# Patient Record
Sex: MALE | Race: WHITE | NOT HISPANIC OR LATINO | Employment: FULL TIME | URBAN - METROPOLITAN AREA
[De-identification: names, ages, dates, MRNs, and addresses within clinical notes are randomized per-mention and may not be internally consistent; named-entity substitution may affect disease eponyms.]

---

## 2018-08-14 ENCOUNTER — OFFICE VISIT (OUTPATIENT)
Dept: RETAIL CLINIC | Facility: CLINIC | Age: 38
End: 2018-08-14

## 2018-08-14 VITALS
SYSTOLIC BLOOD PRESSURE: 118 MMHG | HEART RATE: 78 BPM | DIASTOLIC BLOOD PRESSURE: 74 MMHG | TEMPERATURE: 97.4 F | RESPIRATION RATE: 18 BRPM | OXYGEN SATURATION: 98 %

## 2018-08-14 DIAGNOSIS — T30.0 BURN: Primary | ICD-10-CM

## 2018-08-14 PROCEDURE — 99213 OFFICE O/P EST LOW 20 MIN: CPT | Performed by: NURSE PRACTITIONER

## 2018-08-14 NOTE — PATIENT INSTRUCTIONS
Burn Care, Adult  A burn is an injury to the skin or the tissues under the skin. There are three types of burns:  · First degree. These burns may cause the skin to be red and slightly swollen.  · Second degree. These burns are very painful and cause the skin to be very red. The skin may also leak fluid, look shiny, and develop blisters.  · Third degree. These burns cause permanent damage. They either turn the skin white or black and make it look charred, dry, and leathery.    Taking care of your burn properly can help to prevent pain and infection. It can also help the burn to heal more quickly.  What are the risks?  Complications from burns include:  · Damage to the skin.  · Reduced blood flow near the injury.  · Dead tissue.  · Scarring.  · Problems with movement, if the burn happened near a joint or on the hands or feet.    Severe burns can lead to problems that affect the whole body, such as:  · Fluid loss.  · Less blood circulating in the body.  · Inability to maintain a normal core body temperature (thermoregulation).  · Infection.  · Shock.  · Problems breathing.    How to care for a first-degree burn  Right after a burn:  · Rinse or soak the burn under cool water until the pain stops. Do not put ice on your burn. This can cause more damage.  · Lightly cover the burn with a sterile cloth (dressing).  Burn care  · Follow instructions from your health care provider about:  ? How to clean and take care of the burn.  ? When to change and remove the dressing.  · Check your burn every day for signs of infection. Check for:  ? More redness, swelling, or pain.  ? Warmth.  ? Pus or a bad smell.  Medicine  · Take over-the-counter and prescription medicines only as told by your health care provider.  · If you were prescribed antibiotic medicine, take or apply it as told by your health care provider. Do not stop using the antibiotic even if your condition improves.  General instructions  · To prevent infection, do not  put butter, oil, or other home remedies on your burn.  · Do not rub your burn, even when you are cleaning it.  · Protect your burn from the sun.  How to care for a second-degree burn  Right after a burn:  · Rinse or soak the burn under cool water. Do this for several minutes. Do not put ice on your burn. This can cause more damage.  · Lightly cover the burn with a sterile cloth (dressing).  Burn care  · Raise (elevate) the injured area above the level of your heart while sitting or lying down.  · Follow instructions from your health care provider about:  ? How to clean and take care of the burn.  ? When to change and remove the dressing.  · Check your burn every day for signs of infection. Check for:  ? More redness, swelling, or pain.  ? Warmth.  ? Pus or a bad smell.  Medicine    · Take over-the-counter and prescription medicines only as told by your health care provider.  · If you were prescribed antibiotic medicine, take or apply it as told by your health care provider. Do not stop using the antibiotic even if your condition improves.  General instructions  · To prevent infection:  ? Do not put butter, oil, or other home remedies on the burn.  ? Do not scratch or pick at the burn.  ? Do not break any blisters.  ? Do not peel skin.  · Do not rub your burn, even when you are cleaning it.  · Protect your burn from the sun.  How to care for a third-degree burn  Right after a burn:  · Lightly cover the burn with gauze.  · Seek immediate medical attention.  Burn care  · Raise (elevate) the injured area above the level of your heart while sitting or lying down.  · Drink enough fluid to keep your urine clear or pale yellow.  · Rest as told by your health care provider. Do not participate in sports or other physical activities until your health care provider approves.  · Follow instructions from your health care provider about:  ? How to clean and take care of the burn.  ? When to change and remove the dressing.  · Check  your burn every day for signs of infection. Check for:  ? More redness, swelling, or pain.  ? Warmth.  ? Pus or a bad smell.  Medicine  · Take over-the-counter and prescription medicines only as told by your health care provider.  · If you were prescribed antibiotic medicine, take or apply it as told by your health care provider. Do not stop using the antibiotic even if your condition improves.  General instructions  · To prevent infection:  ? Do not put butter, oil, or other home remedies on the burn.  ? Do not scratch or pick at the burn.  ? Do not break any blisters.  ? Do not peel skin.  ? Do not rub your burn, even when you are cleaning it.  · Protect your burn from the sun.  · Keep all follow-up visits as told by your health care provider. This is important.  Contact a health care provider if:  · Your condition does not improve.  · Your condition gets worse.  · You have a fever.  · Your burn changes in appearance or develops black or red spots.  · Your burn feels warm to the touch.  · Your pain is not controlled with medicine.  Get help right away if:  · You have redness, swelling, or pain at the site of the burn.  · You have fluid, blood, or pus coming from your burn.  · You have red streaks near the burn.  · You have severe pain.  This information is not intended to replace advice given to you by your health care provider. Make sure you discuss any questions you have with your health care provider.  Document Released: 12/18/2006 Document Revised: 07/09/2017 Document Reviewed: 06/06/2017  [employer] Interactive Patient Education © 2018 [employer]
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
## 2019-03-25 ENCOUNTER — OFFICE VISIT (OUTPATIENT)
Dept: RETAIL CLINIC | Facility: CLINIC | Age: 39
End: 2019-03-25

## 2019-03-25 VITALS
DIASTOLIC BLOOD PRESSURE: 90 MMHG | RESPIRATION RATE: 18 BRPM | TEMPERATURE: 98.1 F | HEART RATE: 94 BPM | SYSTOLIC BLOOD PRESSURE: 134 MMHG | OXYGEN SATURATION: 98 %

## 2019-03-25 DIAGNOSIS — R30.9 PAIN WITH URINATION: Primary | ICD-10-CM

## 2019-03-25 DIAGNOSIS — R35.0 URINE FREQUENCY: ICD-10-CM

## 2019-03-25 LAB
BILIRUB BLD-MCNC: NEGATIVE MG/DL
CLARITY, POC: ABNORMAL
COLOR UR: YELLOW
GLUCOSE UR STRIP-MCNC: NEGATIVE MG/DL
KETONES UR QL: NEGATIVE
LEUKOCYTE EST, POC: NEGATIVE
NITRITE UR-MCNC: NEGATIVE MG/ML
PH UR: 6 [PH] (ref 5–8)
PROT UR STRIP-MCNC: NEGATIVE MG/DL
RBC # UR STRIP: ABNORMAL /UL
SP GR UR: 1.02 (ref 1–1.03)
UROBILINOGEN UR QL: NORMAL

## 2019-03-25 PROCEDURE — 99213 OFFICE O/P EST LOW 20 MIN: CPT | Performed by: NURSE PRACTITIONER

## 2019-03-25 PROCEDURE — 81003 URINALYSIS AUTO W/O SCOPE: CPT | Performed by: NURSE PRACTITIONER

## 2019-03-25 RX ORDER — CIPROFLOXACIN 500 MG/1
500 TABLET, FILM COATED ORAL 2 TIMES DAILY
Qty: 28 TABLET | Refills: 0 | Status: SHIPPED | OUTPATIENT
Start: 2019-03-25 | End: 2019-04-08

## 2019-03-25 NOTE — PATIENT INSTRUCTIONS
Urinary Frequency, Adult  Urinary frequency means urinating more often than usual. People with urinary frequency urinate at least 8 times in 24 hours, even if they drink a normal amount of fluid. Although they urinate more often than normal, the total amount of urine produced in a day may be normal. Urinary frequency is also called pollakiuria.  What are the causes?  This condition may be caused by:  · A urinary tract infection.  · Obesity.  · Bladder problems, such as bladder stones.  · Caffeine or alcohol.  · Eating food or drinking fluids that irritate the bladder. These include coffee, tea, soda, artificial sweeteners, citrus, tomato-based foods, and chocolate.  · Certain medicines, such as medicines that help the body get rid of extra fluid (diuretics).  · Muscle or nerve weakness.  · Overactive bladder.  · Chronic diabetes.  · Interstitial cystitis.  · In men, problems with the prostate, such as an enlarged prostate.  · In women, pregnancy.    In some cases, the cause may not be known.  What increases the risk?  This condition is more likely to develop in:  · Women who have gone through menopause.  · Men with prostate problems.  · People with a disease or injury that affects the nerves or spinal cord.  · People who have or have had a condition that affects the brain, such as a stroke.    What are the signs or symptoms?  Symptoms of this condition include:  · Feeling an urgent need to urinate often. The stress and anxiety of needing to find a bathroom quickly can make this urge worse.  · Urinating 8 or more times in 24 hours.  · Urinating as often as every 1 to 2 hours.    How is this diagnosed?  This condition is diagnosed based on your symptoms, your medical history, and a physical exam. You may have tests, such as:  · Blood tests.  · Urine tests.  · Imaging tests, such as X-rays or ultrasounds.  · A bladder test.  · A test of your neurological system. This is the body system that senses the need to  urinate.  · A test to check for problems in the urethra and bladder called cystoscopy.    You may also be asked to keep a bladder diary. A bladder diary is a record of what you eat and drink, how often you urinate, and how much you urinate. You may need to see a health care provider who specializes in conditions of the urinary tract (urologist) or kidneys (nephrologist).  How is this treated?  Treatment for this condition depends on the cause. Sometimes the condition goes away on its own and treatment is not necessary. If treatment is needed, it may include:  · Taking medicine.  · Learning exercises that strengthen the muscles that help control urination.  · Following a bladder training program. This may include:  ? Learning to delay going to the bathroom.  ? Double urinating (voiding). This helps if you are not completely emptying your bladder.  ? Scheduled voiding.  · Making diet changes, such as:  ? Avoiding caffeine.  ? Drinking fewer fluids, especially alcohol.  ? Not drinking in the evening.  ? Not having foods or drinks that may irritate the bladder.  ? Eating foods that help prevent or ease constipation. Constipation can make this condition worse.  · Having the nerves in your bladder stimulated. There are two options for stimulating the nerves to your bladder:  ? Outpatient electrical nerve stimulation. This is done by your health care provider.  ? Surgery to implant a bladder pacemaker. The pacemaker helps to control the urge to urinate.    Follow these instructions at home:  · Keep a bladder diary if told to by your health care provider.  · Take over-the-counter and prescription medicines only as told by your health care provider.  · Do any exercises as told by your health care provider.  · Follow a bladder training program as told by your health care provider.  · Make any recommended diet changes.  · Keep all follow-up visits as told by your health care provider. This is important.  Contact a health care  provider if:  · You start urinating more often.  · You feel pain or irritation when you urinate.  · You notice blood in your urine.  · Your urine looks cloudy.  · You develop a fever.  · You begin vomiting.  Get help right away if:  · You are unable to urinate.  This information is not intended to replace advice given to you by your health care provider. Make sure you discuss any questions you have with your health care provider.  Document Released: 10/14/2010 Document Revised: 01/18/2017 Document Reviewed: 07/13/2016  [employer] Interactive Patient Education © 2019 [employer]    Prostatitis  Prostatitis is swelling or inflammation of the prostate gland. The prostate is a walnut-sized gland that is involved in the production of semen. It is located below a man's bladder, in front of the rectum.  There are four types of prostatitis:  · Chronic nonbacterial prostatitis. This is the most common type of prostatitis. It may be associated with a viral infection or autoimmune disorder.  · Acute bacterial prostatitis. This is the least common type of prostatitis. It starts quickly and is usually associated with a bladder infection, high fever, and shaking chills. It can occur at any age.  · Chronic bacterial prostatitis. This type usually results from acute bacterial prostatitis that happens repeatedly (is recurrent) or has not been treated properly. It can occur in men of any age but is most common among middle-aged men whose prostate has begun to get larger. The symptoms are not as severe as symptoms caused by acute bacterial prostatitis.  · Prostatodynia or chronic pelvic pain syndrome (CPPS). This type is also called pelvic floor disorder. It is associated with increased muscular tone in the pelvis surrounding the prostate.    What are the causes?  Bacterial prostatitis is caused by infection from bacteria. Chronic nonbacterial prostatitis may be caused by:  · Urinary tract infections (UTIs).  · Nerve damage.  · A  response by the body’s disease-fighting system (autoimmune response).  · Chemicals in the urine.    The causes of the other types of prostatitis are usually not known.  What are the signs or symptoms?  Symptoms of this condition vary depending upon the type of prostatitis. If you have acute bacterial prostatitis, you may experience:  · Urinary symptoms, such as:  ? Painful urination.  ? Burning during urination.  ? Frequent and sudden urges to urinate.  ? Inability to start urinating.  ? A weak or interrupted stream of urine.  · Vomiting.  · Nausea.  · Fever.  · Chills.  · Inability to empty the bladder completely.  · Pain in the:  ? Muscles or joints.  ? Lower back.  ? Lower abdomen.    If you have any of the other types of prostatitis, you may experience:  · Urinary symptoms, such as:  ? Sudden urges to urinate.  ? Frequent urination.  ? Difficulty starting urination.  ? Weak urine stream.  ? Dribbling after urination.  · Discharge from the urethra. The urethra is a tube that opens at the end of the penis.  · Pain in the:  ? Testicles.  ? Penis or tip of the penis.  ? Rectum.  ? Area in front of the rectum and below the scrotum (perineum).  · Problems with sexual function.  · Painful ejaculation.  · Bloody semen.    How is this diagnosed?  This condition may be diagnosed based on:  · A physical and medical exam.  · Your symptoms.  · A urine test to check for bacteria.  · An exam in which a health care provider uses a finger to feel the prostate (digital rectal exam).  · A test of a sample of semen.  · Blood tests.  · Ultrasound.  · Removal of prostate tissue to be examined under a microscope (biopsy).  · Tests to check how your body handles urine (urodynamic tests).  · A test to look inside your bladder or urethra (cystoscopy).    How is this treated?  Treatment for this condition depends on the type of prostatitis. Treatment may involve:  · Medicines to relieve pain or inflammation.  · Medicines to help relax  your muscles.  · Physical therapy.  · Heat therapy.  · Techniques to help you control certain body functions (biofeedback).  · Relaxation exercises.  · Antibiotic medicine, if your condition is caused by bacteria.  · Warm water baths (sitz baths). Sitz baths help with relaxing your pelvic floor muscles, which helps to relieve pressure on the prostate.    Follow these instructions at home:  · Take over-the-counter and prescription medicines only as told by your health care provider.  · If you were prescribed an antibiotic, take it as told by your health care provider. Do not stop taking the antibiotic even if you start to feel better.  · If physical therapy, biofeedback, or relaxation exercises were prescribed, do exercises as instructed.  · Take sitz baths as directed by your health care provider. For a sitz bath, sit in warm water that is deep enough to cover your hips and buttocks.  · Keep all follow-up visits as told by your health care provider. This is important.  Contact a health care provider if:  · Your symptoms get worse.  · You have a fever.  Get help right away if:  · You have chills.  · You feel nauseous.  · You vomit.  · You feel light-headed or feel like you are going to faint.  · You are unable to urinate.  · You have blood or blood clots in your urine.  This information is not intended to replace advice given to you by your health care provider. Make sure you discuss any questions you have with your health care provider.  Document Released: 12/15/2001 Document Revised: 09/07/2017 Document Reviewed: 09/07/2017  [employer] Interactive Patient Education © 2019 [employer]

## 2019-03-25 NOTE — PROGRESS NOTES
Subjective   Alex Martinez is a 39 y.o. male.     Urinary Tract Infection    This is a new problem. The current episode started today. The problem has been gradually worsening. The quality of the pain is described as aching. The pain is at a severity of 5/10. The pain is mild. There has been no fever. He is sexually active. There is no history of pyelonephritis. Associated symptoms include frequency, hematuria and urgency. Pertinent negatives include no nausea or vomiting. He has tried nothing for the symptoms. There is no history of kidney stones or recurrent UTIs.        The following portions of the patient's history were reviewed and updated as appropriate: allergies, current medications, past family history, past medical history, past social history, past surgical history and problem list.    Review of Systems   HENT: Negative.    Gastrointestinal: Negative.  Negative for nausea and vomiting.   Genitourinary: Positive for frequency, hematuria and urgency.   Neurological: Positive for headache.       Objective   Physical Exam   Constitutional: He is cooperative. No distress.   HENT:   Head: Normocephalic.   Right Ear: Hearing, tympanic membrane, external ear and ear canal normal.   Left Ear: Hearing, tympanic membrane, external ear and ear canal normal.   Nose: Nose normal.   Mouth/Throat: Oropharynx is clear and moist.   Eyes: Conjunctivae, EOM and lids are normal. Pupils are equal, round, and reactive to light.   Neck: Trachea normal and full passive range of motion without pain.   Cardiovascular: Normal rate, regular rhythm and normal pulses.   Pulmonary/Chest: Effort normal and breath sounds normal.   Abdominal: There is no CVA tenderness.   Genitourinary:   Genitourinary Comments: Patient complains of pain in left testicle area, he reports no swelling or redness in testicles or penis.  Reports weak stream with urine and not much at a time.   Neurological: He is alert.   Skin: Skin is warm. Capillary refill  takes less than 2 seconds.   Psychiatric: He has a normal mood and affect. His speech is normal and behavior is normal.   Vitals reviewed.        Assessment/Plan   Alex was seen today for urinary tract infection.    Diagnoses and all orders for this visit:    Pain with urination  -     POC Urinalysis Dipstick, Automated    Urine frequency  -     POC Urinalysis Dipstick, Automated    Other orders  -     ciprofloxacin (CIPRO) 500 MG tablet; Take 1 tablet by mouth 2 (Two) Times a Day for 14 days.      Follow up with PCP if no improvement or worsening of any symptoms.

## 2019-03-27 ENCOUNTER — TELEPHONE (OUTPATIENT)
Dept: RETAIL CLINIC | Facility: CLINIC | Age: 39
End: 2019-03-27

## 2019-03-27 LAB
BACTERIA UR CULT: NORMAL
BACTERIA UR CULT: NORMAL

## 2019-03-27 NOTE — TELEPHONE ENCOUNTER
Consent signed to leave lab results as message on cell phone. Message left with negative urine culture results. Call clinic if questions or concerns. Clinic phone number left.   Informed the patient of recommendations per Dr. Mujica

## 2021-04-06 ENCOUNTER — BULK ORDERING (OUTPATIENT)
Dept: CASE MANAGEMENT | Facility: OTHER | Age: 41
End: 2021-04-06

## 2021-04-06 DIAGNOSIS — Z23 IMMUNIZATION DUE: ICD-10-CM
